# Patient Record
Sex: MALE | Race: WHITE | Employment: FULL TIME | URBAN - METROPOLITAN AREA
[De-identification: names, ages, dates, MRNs, and addresses within clinical notes are randomized per-mention and may not be internally consistent; named-entity substitution may affect disease eponyms.]

---

## 2019-04-06 ENCOUNTER — HOSPITAL ENCOUNTER (EMERGENCY)
Age: 29
Discharge: HOME | End: 2019-04-07
Attending: EMERGENCY MEDICINE
Payer: COMMERCIAL

## 2019-04-06 DIAGNOSIS — F19.10 POLYSUBSTANCE ABUSE (HCC): ICD-10-CM

## 2019-04-06 DIAGNOSIS — T50.901A ACCIDENTAL DRUG OVERDOSE, INITIAL ENCOUNTER: Primary | ICD-10-CM

## 2019-04-06 LAB
GLUCOSE BLD-MCNC: 94 MG/DL (ref 70–99)
PERFORMED ON: NORMAL

## 2019-04-06 PROCEDURE — 99284 EMERGENCY DEPT VISIT MOD MDM: CPT

## 2019-04-07 VITALS
SYSTOLIC BLOOD PRESSURE: 116 MMHG | TEMPERATURE: 98.6 F | OXYGEN SATURATION: 98 % | DIASTOLIC BLOOD PRESSURE: 84 MMHG | HEART RATE: 65 BPM | RESPIRATION RATE: 18 BRPM

## 2019-04-07 RX ORDER — NALOXONE HYDROCHLORIDE 4 MG/.1ML
1 SPRAY NASAL PRN
Qty: 2 EACH | Refills: 2 | Status: SHIPPED | OUTPATIENT
Start: 2019-04-07

## 2019-04-07 NOTE — ED NOTES
Bed: 1TR-01  Expected date:   Expected time:   Means of arrival:   Comments:  [facility] [address] [place] [address]  04/06/19 5108

## 2019-04-07 NOTE — ED TRIAGE NOTES
MATILDE CALLED FOR PT'S MOTHER ADMINISTERING UNKNOWN AMOUNT OF NARCAN TO PT. PT ADDICTED TO [facility].

## 2019-04-07 NOTE — ED PROVIDER NOTES
reports that he does not drink alcohol. Medications     Discharge Medication List as of 2019  1:06 AM          Allergies     He has No Known Allergies. Physical Exam     INITIAL VITALS: BP: (!) 139/90, Temp: 98.6 °F (37 °C), Pulse: 75, Resp: 20, SpO2: 100 %     General:  Nontoxic appearing, well-developed, well-nourished male. Slightly agitated but cooperative with history and physical    HEENT:  Normocephalic, atraumatic,  Mild rhinorrhea. Yawning intermittently during my exam.    Eyes: Anicteric, EOMI,  Pupils 4-5 mm and reactive billaterally. Eyes closed but opens to command    Neck:  Supple, full ROM    Pulmonary:   Clear to auscultation bilaterally, no wheezes, rales, rhonchi    Cardiac:  Regular rate and rhythm, no murmurs, gallops, or rubs     Abdomen:  Soft, nondistended, nontender. No masses. No guarding, no rebound     Extremities:  Warm, 2+ radial pulses    Skin: No rashes or bruises. No track marks or abscesses appreciated    Neuro:   Awake, alert, moving UE and LE spontaneously. .  Alert and oriented ×4 , eyes closed but opens to command, following commands. Psych:   Mood and affect appropriate, answering questions appropriately. Denies SI and HI      Diagnostic Results       RADIOLOGY:  No orders to display       LABS:   Results for orders placed or performed during the hospital encounter of 19   POCT Glucose   Result Value Ref Range    POC Glucose 94 70 - 99 mg/dl    Performed on ACCU-CHEK      RECENT VITALS:  BP: 116/84, Temp: 98.6 °F (37 °C), Pulse: 65, Resp: 18, SpO2: 98 %     Procedures     Procedures    ED Course     Nursing Notes, Past Medical Hx, Past Surgical Hx, Social Hx, Allergies, and Family Hx were reviewed.     The patient was given the following medications:  Orders Placed This Encounter   Medications    naloxone 4 MG/0.1ML LIQD nasal spray     Si spray by Nasal route as needed for Opioid Reversal     Dispense:  2 each     Refill:  2 PATIENT REFERRED TO:  Jadon Low MD      As needed      DISCHARGE MEDICATIONS:  Discharge Medication List as of 4/7/2019  1:06 AM      START taking these medications    Details   naloxone 4 MG/0.1ML LIQD nasal spray 1 spray by Nasal route as needed for Opioid Reversal, Disp-2 each, R-2Print             DISPOSITION  discharge        Ray Hernandez MD  Resident  04/07/19 4742

## 2021-07-07 ENCOUNTER — HOSPITAL ENCOUNTER (EMERGENCY)
Age: 31
Discharge: HOME | End: 2021-07-07
Attending: EMERGENCY MEDICINE
Payer: MEDICAID

## 2021-07-07 VITALS
TEMPERATURE: 97.8 F | OXYGEN SATURATION: 99 % | SYSTOLIC BLOOD PRESSURE: 139 MMHG | RESPIRATION RATE: 15 BRPM | HEART RATE: 60 BPM | DIASTOLIC BLOOD PRESSURE: 85 MMHG

## 2021-07-07 DIAGNOSIS — F11.90 OPIOID USE DISORDER: ICD-10-CM

## 2021-07-07 DIAGNOSIS — T40.601A OPIATE OVERDOSE, ACCIDENTAL OR UNINTENTIONAL, INITIAL ENCOUNTER (HCC): Primary | ICD-10-CM

## 2021-07-07 LAB
ANION GAP SERPL CALCULATED.3IONS-SCNC: 11 MMOL/L (ref 3–16)
BUN BLDV-MCNC: 11 MG/DL (ref 7–20)
CALCIUM SERPL-MCNC: 9.6 MG/DL (ref 8.3–10.6)
CHLORIDE BLD-SCNC: 103 MMOL/L (ref 99–110)
CO2: 24 MMOL/L (ref 21–32)
CREAT SERPL-MCNC: 0.7 MG/DL (ref 0.9–1.3)
GFR AFRICAN AMERICAN: >60
GFR NON-AFRICAN AMERICAN: >60
GLUCOSE BLD-MCNC: 99 MG/DL (ref 70–99)
MAGNESIUM: 2.1 MG/DL (ref 1.8–2.4)
PHOSPHORUS: 2.4 MG/DL (ref 2.5–4.9)
POTASSIUM SERPL-SCNC: 3.8 MMOL/L (ref 3.5–5.1)
SODIUM BLD-SCNC: 138 MMOL/L (ref 136–145)
TOTAL CK: 45 U/L (ref 39–308)

## 2021-07-07 PROCEDURE — 82550 ASSAY OF CK (CPK): CPT

## 2021-07-07 PROCEDURE — 2580000003 HC RX 258: Performed by: EMERGENCY MEDICINE

## 2021-07-07 PROCEDURE — 99282 EMERGENCY DEPT VISIT SF MDM: CPT

## 2021-07-07 PROCEDURE — 83735 ASSAY OF MAGNESIUM: CPT

## 2021-07-07 PROCEDURE — 80048 BASIC METABOLIC PNL TOTAL CA: CPT

## 2021-07-07 PROCEDURE — 84100 ASSAY OF PHOSPHORUS: CPT

## 2021-07-07 RX ORDER — SODIUM CHLORIDE, SODIUM LACTATE, POTASSIUM CHLORIDE, CALCIUM CHLORIDE 600; 310; 30; 20 MG/100ML; MG/100ML; MG/100ML; MG/100ML
1000 INJECTION, SOLUTION INTRAVENOUS ONCE
Status: COMPLETED | OUTPATIENT
Start: 2021-07-07 | End: 2021-07-07

## 2021-07-07 RX ORDER — CLONIDINE HYDROCHLORIDE 0.1 MG/1
0.1 TABLET ORAL 2 TIMES DAILY PRN
Qty: 15 TABLET | Refills: 0 | Status: SHIPPED | OUTPATIENT
Start: 2021-07-07

## 2021-07-07 RX ORDER — ONDANSETRON 4 MG/1
4 TABLET, ORALLY DISINTEGRATING ORAL EVERY 8 HOURS PRN
Qty: 20 TABLET | Refills: 0 | Status: SHIPPED | OUTPATIENT
Start: 2021-07-07

## 2021-07-07 RX ADMIN — SODIUM CHLORIDE, POTASSIUM CHLORIDE, SODIUM LACTATE AND CALCIUM CHLORIDE 1000 ML: 600; 310; 30; 20 INJECTION, SOLUTION INTRAVENOUS at 21:03

## 2021-07-08 NOTE — ED PROVIDER NOTES
[address] [facility]          ATTENDING PHYSICIAN NOTE       Date of evaluation: 7/7/2021    Chief Complaint     Drug Overdose (used fentanyl, snorted about 3 hours ago- brought in by mother in law d/t inabiltiy to keep patient awake, awake now, snorts 2 grams of fentanyl daily )      History of Present Illness     Dimitry Milan is a 28 y.o. male who presents to the emergency department after an accidental opiate overdose. Patient admits to using what he thought was fentanyl twice during the course the day today and having prolonged periods of unresponsiveness and unconsciousness lasting for multiple hours at a time. The patient reports that he was not attempted to harm himself as this was more of a recreational misadventure. The patient was brought to us by a concern for member who thought that he was largely unarousable at home. He reports that he was most recently in outpatient rehab approximately 1 year ago and has tried medication assisted therapy with Suboxone in the past. He is interested in inpatient rehab resources. He does have Narcan at home. The Narcan was not administered to him today    Review of Systems     As documented in the HPI, otherwise all other systems were reviewed and were negative. Past Medical, Surgical, Family, and Social History     He has a past medical history of Heroin addiction ([address] and Periapical abscess. He has no past surgical history on file. His family history is not on file. He reports that he has been smoking cigarettes. He has been smoking about 1.00 pack per day. He has never used smokeless tobacco. He reports current drug use. Drugs: Cocaine, Opiates , Methamphetamines, and Other-see comments. He reports that he does not drink alcohol. Medications     Previous Medications    NALOXONE 4 MG/0.1ML LIQD NASAL SPRAY    1 spray by Nasal route as needed for Opioid Reversal       Allergies     He has No Known Allergies.     Physical Exam INITIAL VITALS: BP: 116/71, Temp: 97.8 °F (36.6 °C), Pulse: 71, Resp: 20, SpO2: 98 %   General: 21-year-old male lying in bed no apparent cardiorespiratory distress  HEENT:  head is atraumatic, pupils equal round and reactive to light, sclera are clear, oropharynx is nonerythematous  Neck: supple, no lymphadenopathy  Chest: nonlabored respirations, equal chest rise bilaterally, no accessory muscle use  Cardiovascular: Regular, rate, and rhythm, 2+ radial pulses bilaterally, capillary refill 2 seconds  Abdominal: Soft, nontender, nondistended, positive bowel sounds throughout, no rebound or guarding  Skin: Warm, dry well perfused, no rashes  Musculoskeletal: no obvious deformities, no tenderness to palpation diffusely  Neurologic:  alert and oriented x4, speech is clear and intact without dysarthria, moves all 4 extremities with full and equal strength    Diagnostic Results     RADIOLOGY:  No orders to display       LABS:   Results for orders placed or performed during the hospital encounter of 39/21/12   Basic Metabolic Panel   Result Value Ref Range    Sodium 138 136 - 145 mmol/L    Potassium 3.8 3.5 - 5.1 mmol/L    Chloride 103 99 - 110 mmol/L    CO2 24 21 - 32 mmol/L    Anion Gap 11 3 - 16    Glucose 99 70 - 99 mg/dL    BUN 11 7 - 20 mg/dL    CREATININE 0.7 (L) 0.9 - 1.3 mg/dL    GFR Non-African American >60 >60    GFR African American >60 >60    Calcium 9.6 8.3 - 10.6 mg/dL   Phosphorus   Result Value Ref Range    Phosphorus 2.4 (L) 2.5 - 4.9 mg/dL   Magnesium   Result Value Ref Range    Magnesium 2.10 1.80 - 2.40 mg/dL   CK (Lab)   Result Value Ref Range    Total CK 45 39 - 308 U/L       RECENT VITALS:  BP: 116/71, Temp: 97.8 °F (36.6 °C), Pulse: 71, Resp: 20, SpO2: 98 %     ED Course     Nursing Notes, Past Medical Hx, Past Surgical Hx, Social Hx, Allergies, and Family Hx were reviewed.     The patient was given the following medications:  Orders Placed This Encounter   Medications    lactated ringers

## 2024-03-21 ENCOUNTER — HOSPITAL ENCOUNTER (EMERGENCY)
Age: 34
Discharge: HOME | End: 2024-03-21
Attending: EMERGENCY MEDICINE

## 2024-03-21 ENCOUNTER — APPOINTMENT (OUTPATIENT)
Dept: GENERAL RADIOLOGY | Age: 34
End: 2024-03-21

## 2024-03-21 VITALS
DIASTOLIC BLOOD PRESSURE: 77 MMHG | HEIGHT: 69 IN | SYSTOLIC BLOOD PRESSURE: 144 MMHG | HEART RATE: 96 BPM | WEIGHT: 202 LBS | OXYGEN SATURATION: 100 % | BODY MASS INDEX: 29.92 KG/M2 | RESPIRATION RATE: 16 BRPM | TEMPERATURE: 97.4 F

## 2024-03-21 DIAGNOSIS — S61.219A FINGER LACERATION INVOLVING TENDON, INITIAL ENCOUNTER: ICD-10-CM

## 2024-03-21 DIAGNOSIS — S61.210A LACERATION OF RIGHT INDEX FINGER WITHOUT FOREIGN BODY WITHOUT DAMAGE TO NAIL, INITIAL ENCOUNTER: Primary | ICD-10-CM

## 2024-03-21 PROCEDURE — 12002 RPR S/N/AX/GEN/TRNK2.6-7.5CM: CPT

## 2024-03-21 PROCEDURE — 2500000003 HC RX 250 WO HCPCS: Performed by: PHYSICIAN ASSISTANT

## 2024-03-21 PROCEDURE — 90715 TDAP VACCINE 7 YRS/> IM: CPT | Performed by: EMERGENCY MEDICINE

## 2024-03-21 PROCEDURE — 6370000000 HC RX 637 (ALT 250 FOR IP): Performed by: PHYSICIAN ASSISTANT

## 2024-03-21 PROCEDURE — 99284 EMERGENCY DEPT VISIT MOD MDM: CPT

## 2024-03-21 PROCEDURE — 90471 IMMUNIZATION ADMIN: CPT | Performed by: EMERGENCY MEDICINE

## 2024-03-21 PROCEDURE — 6360000002 HC RX W HCPCS: Performed by: EMERGENCY MEDICINE

## 2024-03-21 PROCEDURE — 73140 X-RAY EXAM OF FINGER(S): CPT

## 2024-03-21 RX ORDER — CEPHALEXIN 500 MG/1
500 CAPSULE ORAL ONCE
Status: COMPLETED | OUTPATIENT
Start: 2024-03-21 | End: 2024-03-21

## 2024-03-21 RX ORDER — LIDOCAINE HYDROCHLORIDE 10 MG/ML
5 INJECTION, SOLUTION INFILTRATION; PERINEURAL ONCE
Status: COMPLETED | OUTPATIENT
Start: 2024-03-21 | End: 2024-03-21

## 2024-03-21 RX ORDER — CEPHALEXIN 500 MG/1
500 CAPSULE ORAL 4 TIMES DAILY
Qty: 28 CAPSULE | Refills: 0 | Status: SHIPPED | OUTPATIENT
Start: 2024-03-21 | End: 2024-03-28

## 2024-03-21 RX ADMIN — CEPHALEXIN 500 MG: 500 CAPSULE ORAL at 23:45

## 2024-03-21 RX ADMIN — TETANUS TOXOID, REDUCED DIPHTHERIA TOXOID AND ACELLULAR PERTUSSIS VACCINE, ADSORBED 0.5 ML: 5; 2.5; 8; 8; 2.5 SUSPENSION INTRAMUSCULAR at 21:36

## 2024-03-21 RX ADMIN — LIDOCAINE HYDROCHLORIDE 5 ML: 10 INJECTION, SOLUTION INFILTRATION; PERINEURAL at 23:46

## 2024-03-21 ASSESSMENT — PAIN SCALES - GENERAL: PAINLEVEL_OUTOF10: 0

## 2024-03-21 ASSESSMENT — PAIN - FUNCTIONAL ASSESSMENT: PAIN_FUNCTIONAL_ASSESSMENT: 0-10

## 2024-03-21 NOTE — ED TRIAGE NOTES
Adonis called to set up HD. Safety education provided and call  light in reach.     Problem: Patient Centered Care  Goal: Patient preferences are identified and integrated in the patient's plan of care  Description: Interventions:  - What would you like Pt c/o laceration right pointer finger from knife while cutting chicken 30 minutes ago. Unknown last tetanus   emergency measures for life threatening arrhythmias  - Monitor electrolytes and administer replacement therapy as ordered  Outcome: Progressing     Problem: RESPIRATORY - ADULT  Goal: Achieves optimal ventilation and oxygenation  Description: INTERVENTIONS

## 2024-03-22 ASSESSMENT — ENCOUNTER SYMPTOMS
ABDOMINAL PAIN: 0
ROS SKIN COMMENTS: LACERATION

## 2024-03-22 NOTE — ED PROVIDER NOTES
ED Attending Attestation Note     Date of evaluation: 3/21/2024    This patient was seen by the advance practice provider.  I have seen and examined the patient, agree with the workup, evaluation, management and diagnosis. The care plan has been discussed.  My assessment reveals was prepared  for the knife accidentally to the left index finger dorsally.  Positive flap laceration.  Positive venous pumper.  Direct pressure applied.  2 point intact less than 5 mm of that digit.  No any other injuries noted.  Tetanus was updated here in the ED.  X-rays ordered.  Physician present during repair.       Ronaldo John MD  03/21/24 2110

## 2024-03-22 NOTE — ED PROVIDER NOTES
[facility]  EMERGENCY DEPARTMENT ENCOUNTER          PHYSICIAN ASSISTANT NOTE       Date of evaluation: 3/21/2024    Chief Complaint     Laceration (Pt c/o laceration right pointer finger from knife while cutting chicken 30 minutes ago. Unknown last tetanus)      History of Present Illness     Clive Pereira is a 33 y.o. male who presents to the emergency room with a right index finger laceration.  Patient states that he has been drinking and went to prepare dinner.  While cutting chicken he accidentally cut his right index finger.  He had difficulty controlling the bleeding, therefore presented to the emergency department for evaluation.  Denies any pain at the time of evaluation.  Denies numbness, tingling, weakness.  Denies any other injuries.  He is unsure if his tetanus shot is up-to-date    ASSESSMENT / PLAN  (MEDICAL DECISION MAKING)     INITIAL VITALS: BP: (!) 144/77, Temp: 97.4 °F (36.3 °C), Pulse: 96, Respirations: 16, SpO2: 100 %    Clive Pereira is a 33 y.o. male who presents to the emerged part with right index finger laceration.  Vital signs stable on presentation remained stable throughout his stay.  Thorough history and physical exam performed.    Patient presents to the emerged part with a right index finger laceration.  He was drinking tonight when he went to prepare dinner and accidentally cut his right index finger.  He was unable to control the bleeding, therefore presented to the emergency department for evaluation.  On exam he does have a laceration proximal to the PIP joint on the dorsum of the right index finger.  He does have significant venous oozing on exam making it difficult to evaluate the depth.  He has full flexion and extension of the MCP, PIP, DIP joint of the affected finger.  His tetanus shot was updated in the emergency department.  X-ray showed no acute osseous abnormality.  Digital block was performed as finger tourniquet was applied.  Under bloodless field, we were able

## 2024-03-22 NOTE — DISCHARGE INSTRUCTIONS
You had 5 sutures placed in your left index finger today.  You do have a partial tendon injury, therefore should follow-up with hand surgery as an outpatient next week.  You were also given a prescription for Keflex to take to help prevent infection.  Follow-up with your primary care provider as an outpatient.  Return to the emergency department for uncontrolled pain, redness, swelling, or other concern for infection.

## 2024-03-27 ENCOUNTER — OFFICE VISIT (OUTPATIENT)
Dept: ORTHOPEDIC SURGERY | Age: 34
End: 2024-03-27
Payer: MEDICAID

## 2024-03-27 VITALS — HEIGHT: 69 IN | WEIGHT: 202 LBS | BODY MASS INDEX: 29.92 KG/M2

## 2024-03-27 DIAGNOSIS — S61.210S LACERATION OF RIGHT INDEX FINGER WITHOUT FOREIGN BODY WITHOUT DAMAGE TO NAIL, SEQUELA: Primary | ICD-10-CM

## 2024-03-27 PROCEDURE — 99203 OFFICE O/P NEW LOW 30 MIN: CPT | Performed by: STUDENT IN AN ORGANIZED HEALTH CARE EDUCATION/TRAINING PROGRAM

## 2024-03-27 NOTE — PROGRESS NOTES
No obvious lymphadenopathy  Neuro: Alert and oriented to person, place, time, and situation. No focal, motor, or sensory deficit except as noted below.  Psychiatric: Mood and affect normal and appropriate       Right hand Exam     Laceration over the dorsal index finger at the level of the proximal phalanx, predominantly on the ulnar side of the finger, nylon stitches in place    Full extension of the index finger beyond neutral    Sensation intact to the index finger, fingertip is warm and pink with good turgor    All fingertips are pink with good capillary refill and of normal temperature.  No edema.  No areas of ischemia or ulcers.      Radiographs & Imaging     3 view X-rays of the right hand dated 3/21/2024 were reviewed independently and discussed with the patient.  The films revealed: No evidence of acute fracture dislocation, soft tissue irregularity appreciated dorsally over proximal phalanx of the index finger    Other Diagnostic Studies and Scales     None      Assessment     Clive Pereira is a 33 y.o. male with right index finger laceration, no injury to underlying structures including extensor tendon.    Plan     We discussed this acute problem including diagnosis, prognosis, and treatment options along with risks, benefits, and alternatives.  I explained the patient that he has no evidence of injury to deep structures.  He is got full range of motion of his finger and normal sensation.  We will take his stitches out today as his laceration is largely healed.  He can apply ointment and start massage to the laceration.  Otherwise he can return to activity as tolerated.  He can return on as-needed basis.           No orders of the defined types were placed in this encounter.